# Patient Record
Sex: FEMALE | Race: WHITE | NOT HISPANIC OR LATINO | Employment: FULL TIME | URBAN - METROPOLITAN AREA
[De-identification: names, ages, dates, MRNs, and addresses within clinical notes are randomized per-mention and may not be internally consistent; named-entity substitution may affect disease eponyms.]

---

## 2022-03-21 ENCOUNTER — TELEPHONE (OUTPATIENT)
Dept: AUDIOLOGY | Facility: CLINIC | Age: 42
End: 2022-03-21
Payer: COMMERCIAL

## 2022-04-03 ENCOUNTER — HEALTH MAINTENANCE LETTER (OUTPATIENT)
Age: 42
End: 2022-04-03

## 2022-07-21 PROCEDURE — 36415 COLL VENOUS BLD VENIPUNCTURE: CPT | Performed by: EMERGENCY MEDICINE

## 2022-07-21 PROCEDURE — 81001 URINALYSIS AUTO W/SCOPE: CPT | Performed by: EMERGENCY MEDICINE

## 2022-07-21 PROCEDURE — 99285 EMERGENCY DEPT VISIT HI MDM: CPT | Mod: 25

## 2022-07-21 PROCEDURE — 96375 TX/PRO/DX INJ NEW DRUG ADDON: CPT

## 2022-07-21 PROCEDURE — 96376 TX/PRO/DX INJ SAME DRUG ADON: CPT

## 2022-07-21 PROCEDURE — 85027 COMPLETE CBC AUTOMATED: CPT | Performed by: EMERGENCY MEDICINE

## 2022-07-21 PROCEDURE — 80053 COMPREHEN METABOLIC PANEL: CPT | Performed by: EMERGENCY MEDICINE

## 2022-07-21 RX ORDER — KETOROLAC TROMETHAMINE 15 MG/ML
15 INJECTION, SOLUTION INTRAMUSCULAR; INTRAVENOUS ONCE
Status: COMPLETED | OUTPATIENT
Start: 2022-07-22 | End: 2022-07-22

## 2022-07-21 RX ORDER — ONDANSETRON 2 MG/ML
4 INJECTION INTRAMUSCULAR; INTRAVENOUS ONCE
Status: COMPLETED | OUTPATIENT
Start: 2022-07-22 | End: 2022-07-22

## 2022-07-21 RX ORDER — MORPHINE SULFATE 4 MG/ML
4 INJECTION, SOLUTION INTRAMUSCULAR; INTRAVENOUS ONCE
Status: COMPLETED | OUTPATIENT
Start: 2022-07-22 | End: 2022-07-22

## 2022-07-22 ENCOUNTER — HOSPITAL ENCOUNTER (EMERGENCY)
Facility: CLINIC | Age: 42
Discharge: HOME | End: 2022-07-22
Attending: EMERGENCY MEDICINE | Admitting: EMERGENCY MEDICINE
Payer: COMMERCIAL

## 2022-07-22 ENCOUNTER — APPOINTMENT (OUTPATIENT)
Dept: CT IMAGING | Facility: CLINIC | Age: 42
End: 2022-07-22
Attending: EMERGENCY MEDICINE
Payer: COMMERCIAL

## 2022-07-22 VITALS
HEART RATE: 81 BPM | OXYGEN SATURATION: 95 % | WEIGHT: 154 LBS | DIASTOLIC BLOOD PRESSURE: 78 MMHG | TEMPERATURE: 98.9 F | SYSTOLIC BLOOD PRESSURE: 134 MMHG | RESPIRATION RATE: 16 BRPM

## 2022-07-22 DIAGNOSIS — N20.1 URETEROLITHIASIS: ICD-10-CM

## 2022-07-22 DIAGNOSIS — N23 RENAL COLIC ON RIGHT SIDE: ICD-10-CM

## 2022-07-22 LAB
ALBUMIN SERPL-MCNC: 3.6 G/DL (ref 3.4–5)
ALBUMIN UR-MCNC: 30 MG/DL
ALP SERPL-CCNC: 180 U/L (ref 40–150)
ALT SERPL W P-5'-P-CCNC: 74 U/L (ref 0–50)
ANION GAP SERPL CALCULATED.3IONS-SCNC: 6 MMOL/L (ref 3–14)
APPEARANCE UR: CLEAR
AST SERPL W P-5'-P-CCNC: 48 U/L (ref 0–45)
BILIRUB SERPL-MCNC: 0.4 MG/DL (ref 0.2–1.3)
BILIRUB UR QL STRIP: NEGATIVE
BUN SERPL-MCNC: 18 MG/DL (ref 7–30)
CALCIUM SERPL-MCNC: 9.1 MG/DL (ref 8.5–10.1)
CHLORIDE BLD-SCNC: 105 MMOL/L (ref 94–109)
CO2 SERPL-SCNC: 26 MMOL/L (ref 20–32)
COLOR UR AUTO: ABNORMAL
CREAT SERPL-MCNC: 0.69 MG/DL (ref 0.52–1.04)
ERYTHROCYTE [DISTWIDTH] IN BLOOD BY AUTOMATED COUNT: 14 % (ref 10–15)
GFR SERPL CREATININE-BSD FRML MDRD: >90 ML/MIN/1.73M2
GLUCOSE BLD-MCNC: 252 MG/DL (ref 70–99)
GLUCOSE UR STRIP-MCNC: >=1000 MG/DL
HCT VFR BLD AUTO: 37.9 % (ref 35–47)
HGB BLD-MCNC: 11.5 G/DL (ref 11.7–15.7)
HGB UR QL STRIP: ABNORMAL
HOLD SPECIMEN: NORMAL
KETONES UR STRIP-MCNC: NEGATIVE MG/DL
LEUKOCYTE ESTERASE UR QL STRIP: NEGATIVE
MCH RBC QN AUTO: 25.7 PG (ref 26.5–33)
MCHC RBC AUTO-ENTMCNC: 30.3 G/DL (ref 31.5–36.5)
MCV RBC AUTO: 85 FL (ref 78–100)
MUCOUS THREADS #/AREA URNS LPF: PRESENT /LPF
NITRATE UR QL: NEGATIVE
PH UR STRIP: 5 [PH] (ref 5–7)
PLATELET # BLD AUTO: 110 10E3/UL (ref 150–450)
POTASSIUM BLD-SCNC: 3.8 MMOL/L (ref 3.4–5.3)
PROT SERPL-MCNC: 7.9 G/DL (ref 6.8–8.8)
RBC # BLD AUTO: 4.48 10E6/UL (ref 3.8–5.2)
RBC URINE: 50 /HPF
SODIUM SERPL-SCNC: 137 MMOL/L (ref 133–144)
SP GR UR STRIP: 1.02 (ref 1–1.03)
SQUAMOUS EPITHELIAL: 3 /HPF
UROBILINOGEN UR STRIP-MCNC: NORMAL MG/DL
WBC # BLD AUTO: 5.3 10E3/UL (ref 4–11)
WBC URINE: <1 /HPF

## 2022-07-22 PROCEDURE — 74176 CT ABD & PELVIS W/O CONTRAST: CPT

## 2022-07-22 PROCEDURE — 96374 THER/PROPH/DIAG INJ IV PUSH: CPT

## 2022-07-22 PROCEDURE — 250N000011 HC RX IP 250 OP 636: Performed by: EMERGENCY MEDICINE

## 2022-07-22 RX ORDER — OXYCODONE HYDROCHLORIDE 5 MG/1
5 TABLET ORAL EVERY 6 HOURS PRN
Qty: 12 TABLET | Refills: 0 | Status: SHIPPED | OUTPATIENT
Start: 2022-07-22

## 2022-07-22 RX ORDER — HYDROMORPHONE HYDROCHLORIDE 1 MG/ML
0.5 INJECTION, SOLUTION INTRAMUSCULAR; INTRAVENOUS; SUBCUTANEOUS ONCE
Status: COMPLETED | OUTPATIENT
Start: 2022-07-22 | End: 2022-07-22

## 2022-07-22 RX ORDER — VENLAFAXINE 37.5 MG/1
37.5 TABLET ORAL 2 TIMES DAILY
COMMUNITY

## 2022-07-22 RX ORDER — HYDROMORPHONE HYDROCHLORIDE 1 MG/ML
0.5 INJECTION, SOLUTION INTRAMUSCULAR; INTRAVENOUS; SUBCUTANEOUS
Status: DISCONTINUED | OUTPATIENT
Start: 2022-07-22 | End: 2022-07-22 | Stop reason: HOSPADM

## 2022-07-22 RX ORDER — LISINOPRIL 5 MG/1
5 TABLET ORAL DAILY
COMMUNITY

## 2022-07-22 RX ORDER — ONDANSETRON 4 MG/1
4 TABLET, ORALLY DISINTEGRATING ORAL EVERY 6 HOURS PRN
Qty: 10 TABLET | Refills: 0 | Status: SHIPPED | OUTPATIENT
Start: 2022-07-22 | End: 2022-07-25

## 2022-07-22 RX ORDER — ROSUVASTATIN CALCIUM 10 MG/1
10 TABLET, COATED ORAL DAILY
COMMUNITY

## 2022-07-22 RX ORDER — TAMSULOSIN HYDROCHLORIDE 0.4 MG/1
0.4 CAPSULE ORAL DAILY
Qty: 10 CAPSULE | Refills: 0 | Status: SHIPPED | OUTPATIENT
Start: 2022-07-22 | End: 2022-08-01

## 2022-07-22 RX ADMIN — KETOROLAC TROMETHAMINE 15 MG: 15 INJECTION, SOLUTION INTRAMUSCULAR; INTRAVENOUS at 00:03

## 2022-07-22 RX ADMIN — HYDROMORPHONE HYDROCHLORIDE 0.5 MG: 1 INJECTION, SOLUTION INTRAMUSCULAR; INTRAVENOUS; SUBCUTANEOUS at 02:06

## 2022-07-22 RX ADMIN — MORPHINE SULFATE 4 MG: 4 INJECTION INTRAVENOUS at 00:04

## 2022-07-22 RX ADMIN — HYDROMORPHONE HYDROCHLORIDE 0.5 MG: 1 INJECTION, SOLUTION INTRAMUSCULAR; INTRAVENOUS; SUBCUTANEOUS at 00:33

## 2022-07-22 RX ADMIN — ONDANSETRON 4 MG: 2 INJECTION INTRAMUSCULAR; INTRAVENOUS at 00:03

## 2022-07-22 ASSESSMENT — ENCOUNTER SYMPTOMS
NAUSEA: 1
VOMITING: 1
FLANK PAIN: 1

## 2022-07-22 NOTE — ED TRIAGE NOTES
Pt complaining of right flank pain that started today after flight. History of kidney stones and this feels like previous.      Triage Assessment     Row Name 07/21/22 2835       Triage Assessment (Adult)    Airway WDL WDL       Respiratory WDL    Respiratory WDL WDL       Skin Circulation/Temperature WDL    Skin Circulation/Temperature WDL WDL       Cardiac WDL    Cardiac WDL WDL       Peripheral/Neurovascular WDL    Peripheral Neurovascular WDL WDL       Cognitive/Neuro/Behavioral WDL    Cognitive/Neuro/Behavioral WDL WDL

## 2022-07-22 NOTE — ED PROVIDER NOTES
History     Chief Complaint:  Flank Pain     HPI   Monse Benavidez is a 42 year old female with history of kidney stones and type I diabetes who presents for evaluation of flank pain. Tonight around 2030 the patient started to develop right flank pain with associated nausea and vomiting. This pain has been progressively worsening since onset, prompting her to come into the ED for evaluation. She reports a history of kidney stones and notes that her pain tonight feels identical to what she has experienced with these. She did take some Ibuprofen prior to arrival without significant improvement of her pain. She has never required surgery for her kidney stones.     Review of Systems   Gastrointestinal: Positive for nausea and vomiting.   Genitourinary: Positive for flank pain (right).   All other systems reviewed and are negative.    Allergies:  No known drug allergies    Medications:  Insulin regular  Lisinopril  Metformin  Rosuvastatin  Effexor     Past Medical History:     Kidney stone  Endometrial cancer   CASPER   PCOS  Hypertriglyceridemia  Diabetes mellitus, type I   Myhre syndrome     Past Surgical History:    Appendectomy  Cataract extraction  cholecystectomy  Colonoscopy  Dilation and curettage   Endoscopic release carpal tunnel bilateral  Ear bone anchored, hearing aid and implantation  Omentectomy  JERRY and BSO   Tonsillectomy  Tympanoplasty       Social History:  Marital status:   The patient presents to the ED accompanied by her .     Physical Exam     Patient Vitals for the past 24 hrs:   BP Temp Temp src Pulse Resp SpO2 Weight   07/22/22 0211 134/78 -- -- 81 16 95 % --   07/22/22 0057 -- -- -- -- -- 93 % --   07/22/22 0034 -- -- -- -- -- 96 % --   07/21/22 2332 147/76 98.9  F (37.2  C) Oral 85 20 96 % 69.9 kg (154 lb)     Physical Exam  Nursing note and vitals reviewed.  Constitutional: Cooperative.   HENT:   Mouth/Throat: Mucous membranes are normal.   Cardiovascular: Normal rate,  regular rhythm and normal heart sounds.  No murmur.  Pulmonary/Chest: Effort normal and breath sounds normal. No respiratory distress. No wheezes. No rales.   Abdominal: Soft. Normal appearance and bowel sounds are normal. No distension. There is no tenderness. There is no rigidity and no guarding.   Neurological: Alert. Oriented x4  Skin: Skin is warm and dry.   Psychiatric: Normal mood and affect.      Emergency Department Course     Imaging:  Abd/pelvis CT no contrast - Stone Protocol   Final Result   IMPRESSION:    1.  Obstructing right ureteropelvic junction 0.4 cm calculus, resulting in mild right hydronephrosis. Additional nonobstructing nephrolithiasis bilaterally.       2.  Question mildly nodular hepatic surface contour. Correlate for chronic hepatocellular disease versus cirrhosis.      3.  Mild splenomegaly.   Report per radiology    Laboratory:  Labs Ordered and Resulted from Time of ED Arrival to Time of ED Departure   ROUTINE UA WITH MICROSCOPIC REFLEX TO CULTURE - Abnormal       Result Value    Color Urine Light Yellow      Appearance Urine Clear      Glucose Urine >=1000 (*)     Bilirubin Urine Negative      Ketones Urine Negative      Specific Gravity Urine 1.023      Blood Urine Moderate (*)     pH Urine 5.0      Protein Albumin Urine 30  (*)     Urobilinogen Urine Normal      Nitrite Urine Negative      Leukocyte Esterase Urine Negative      Mucus Urine Present (*)     RBC Urine 50 (*)     WBC Urine <1      Squamous Epithelials Urine 3 (*)    CBC WITH PLATELETS - Abnormal    WBC Count 5.3      RBC Count 4.48      Hemoglobin 11.5 (*)     Hematocrit 37.9      MCV 85      MCH 25.7 (*)     MCHC 30.3 (*)     RDW 14.0      Platelet Count 110 (*)    COMPREHENSIVE METABOLIC PANEL - Abnormal    Sodium 137      Potassium 3.8      Chloride 105      Carbon Dioxide (CO2) 26      Anion Gap 6      Urea Nitrogen 18      Creatinine 0.69      Calcium 9.1      Glucose 252 (*)     Alkaline Phosphatase 180 (*)      AST 48 (*)     ALT 74 (*)     Protein Total 7.9      Albumin 3.6      Bilirubin Total 0.4      GFR Estimate >90         Reviewed:  I reviewed nursing notes, vitals and past medical history    Assessments:  0038:  I obtained history and examined the patient as noted above.   0144: I updated and reassessed the patient. She was resting comfortably.     Interventions:  0003 Toradol 15 mg IV   0003 Zofran 4 mg IV   0004 Morphine 4 mg IV   0033 Dilaudid 0.5 mg IV     Disposition:  The patient was discharged to home.     Impression & Plan     Medical Decision Making:  Monse Benavidez is a 42 year old female presented to the Emergency Department with a complaint of flank pain. The workup in the ED did demonstrate an 0.4 mm renal stone on the right side. The urine is non-infected. At this time, the patient's pain has been controlled. I believe she can be discharged and can follow up in the outpatient setting. I have encouraged close Primary Care Physician follow up. If symptoms persist, Urology evaluation will be warranted. I have encouraged the patient to return to the ED for symptoms such as intractable pain, fever, and persistent vomiting. Full anticipatory guidance given prior to discharge.      Diagnosis:    ICD-10-CM    1. Renal colic on right side  N23    2. Ureterolithiasis  N20.1        Discharge Medications:  Discharge Medication List as of 7/22/2022  1:56 AM      START taking these medications    Details   ondansetron (ZOFRAN ODT) 4 MG ODT tab Take 1 tablet (4 mg) by mouth every 6 hours as needed for nausea, Disp-10 tablet, R-0, Local Print      oxyCODONE (ROXICODONE) 5 MG tablet Take 1 tablet (5 mg) by mouth every 6 hours as needed for severe pain, Disp-12 tablet, R-0, Local Print      tamsulosin (FLOMAX) 0.4 MG capsule Take 1 capsule (0.4 mg) by mouth daily for 10 doses, Disp-10 capsule, R-0, Local Print             Scribe Disclosure:  Jama NEVAREZ, am serving as a scribe at 12:35 AM on 7/22/2022  to document services personally performed by Jerry Barrera MD based on my observations and the provider's statements to me.           Jerry Barrera MD  07/22/22 5475

## 2022-10-03 ENCOUNTER — HEALTH MAINTENANCE LETTER (OUTPATIENT)
Age: 42
End: 2022-10-03

## 2022-11-06 ENCOUNTER — APPOINTMENT (OUTPATIENT)
Dept: CT IMAGING | Facility: CLINIC | Age: 42
End: 2022-11-06
Attending: EMERGENCY MEDICINE
Payer: COMMERCIAL

## 2022-11-06 ENCOUNTER — HOSPITAL ENCOUNTER (EMERGENCY)
Facility: CLINIC | Age: 42
Discharge: HOME | End: 2022-11-06
Attending: EMERGENCY MEDICINE | Admitting: EMERGENCY MEDICINE
Payer: COMMERCIAL

## 2022-11-06 VITALS
TEMPERATURE: 97.3 F | SYSTOLIC BLOOD PRESSURE: 131 MMHG | OXYGEN SATURATION: 95 % | RESPIRATION RATE: 18 BRPM | DIASTOLIC BLOOD PRESSURE: 75 MMHG | HEART RATE: 75 BPM

## 2022-11-06 DIAGNOSIS — K52.9 ENTEROCOLITIS: ICD-10-CM

## 2022-11-06 DIAGNOSIS — E86.0 DEHYDRATION: ICD-10-CM

## 2022-11-06 DIAGNOSIS — R11.0 NAUSEA: ICD-10-CM

## 2022-11-06 LAB
ALBUMIN SERPL BCG-MCNC: 4.3 G/DL (ref 3.5–5.2)
ALP SERPL-CCNC: 209 U/L (ref 35–104)
ALT SERPL W P-5'-P-CCNC: 64 U/L (ref 10–35)
ANION GAP SERPL CALCULATED.3IONS-SCNC: 16 MMOL/L (ref 7–15)
AST SERPL W P-5'-P-CCNC: 62 U/L (ref 10–35)
BASE EXCESS BLDV CALC-SCNC: -3.2 MMOL/L (ref -7.7–1.9)
BASOPHILS # BLD AUTO: 0 10E3/UL (ref 0–0.2)
BASOPHILS NFR BLD AUTO: 0 %
BILIRUB SERPL-MCNC: 0.7 MG/DL
BUN SERPL-MCNC: 15.2 MG/DL (ref 6–20)
C COLI+JEJUNI+LARI FUSA STL QL NAA+PROBE: NOT DETECTED
C DIFF TOX B STL QL: NEGATIVE
CALCIUM SERPL-MCNC: 9.1 MG/DL (ref 8.6–10)
CHLORIDE SERPL-SCNC: 103 MMOL/L (ref 98–107)
CREAT BLD-MCNC: 0.7 MG/DL (ref 0.5–1)
CREAT SERPL-MCNC: 0.68 MG/DL (ref 0.51–0.95)
DEPRECATED HCO3 PLAS-SCNC: 19 MMOL/L (ref 22–29)
EC STX1 GENE STL QL NAA+PROBE: NOT DETECTED
EC STX2 GENE STL QL NAA+PROBE: NOT DETECTED
EOSINOPHIL # BLD AUTO: 0 10E3/UL (ref 0–0.7)
EOSINOPHIL NFR BLD AUTO: 0 %
ERYTHROCYTE [DISTWIDTH] IN BLOOD BY AUTOMATED COUNT: 15.6 % (ref 10–15)
GFR SERPL CREATININE-BSD FRML MDRD: >60 ML/MIN/1.73M2
GFR SERPL CREATININE-BSD FRML MDRD: >90 ML/MIN/1.73M2
GLUCOSE BLDC GLUCOMTR-MCNC: 127 MG/DL (ref 70–99)
GLUCOSE SERPL-MCNC: 65 MG/DL (ref 70–99)
HCO3 BLDV-SCNC: 23 MMOL/L (ref 21–28)
HCT VFR BLD AUTO: 50.4 % (ref 35–47)
HGB BLD-MCNC: 14.9 G/DL (ref 11.7–15.7)
IMM GRANULOCYTES # BLD: 0 10E3/UL
IMM GRANULOCYTES NFR BLD: 0 %
KETONES BLD-SCNC: 0.2 MMOL/L (ref 0–0.6)
LIPASE SERPL-CCNC: 20 U/L (ref 13–60)
LYMPHOCYTES # BLD AUTO: 2.8 10E3/UL (ref 0.8–5.3)
LYMPHOCYTES NFR BLD AUTO: 27 %
MCH RBC QN AUTO: 25.2 PG (ref 26.5–33)
MCHC RBC AUTO-ENTMCNC: 29.6 G/DL (ref 31.5–36.5)
MCV RBC AUTO: 85 FL (ref 78–100)
MONOCYTES # BLD AUTO: 0.7 10E3/UL (ref 0–1.3)
MONOCYTES NFR BLD AUTO: 6 %
NEUTROPHILS # BLD AUTO: 7.1 10E3/UL (ref 1.6–8.3)
NEUTROPHILS NFR BLD AUTO: 67 %
NOROV GI+II ORF1-ORF2 JNC STL QL NAA+PR: NOT DETECTED
NRBC # BLD AUTO: 0 10E3/UL
NRBC BLD AUTO-RTO: 0 /100
O2/TOTAL GAS SETTING VFR VENT: 0 %
OXYHGB MFR BLDV: 89 % (ref 70–75)
PCO2 BLDV: 45 MM HG (ref 40–50)
PH BLDV: 7.32 [PH] (ref 7.32–7.43)
PLATELET # BLD AUTO: 180 10E3/UL (ref 150–450)
PO2 BLDV: 62 MM HG (ref 25–47)
POTASSIUM SERPL-SCNC: 4 MMOL/L (ref 3.4–5.3)
PROT SERPL-MCNC: 8.9 G/DL (ref 6.4–8.3)
RBC # BLD AUTO: 5.91 10E6/UL (ref 3.8–5.2)
RVA NSP5 STL QL NAA+PROBE: NOT DETECTED
SALMONELLA SP RPOD STL QL NAA+PROBE: NOT DETECTED
SHIGELLA SP+EIEC IPAH STL QL NAA+PROBE: NOT DETECTED
SODIUM SERPL-SCNC: 138 MMOL/L (ref 136–145)
V CHOL+PARA RFBL+TRKH+TNAA STL QL NAA+PR: NOT DETECTED
WBC # BLD AUTO: 10.7 10E3/UL (ref 4–11)
Y ENTERO RECN STL QL NAA+PROBE: NOT DETECTED

## 2022-11-06 PROCEDURE — 96365 THER/PROPH/DIAG IV INF INIT: CPT | Mod: 59

## 2022-11-06 PROCEDURE — 85025 COMPLETE CBC W/AUTO DIFF WBC: CPT | Performed by: EMERGENCY MEDICINE

## 2022-11-06 PROCEDURE — 87506 IADNA-DNA/RNA PROBE TQ 6-11: CPT | Performed by: EMERGENCY MEDICINE

## 2022-11-06 PROCEDURE — 96361 HYDRATE IV INFUSION ADD-ON: CPT

## 2022-11-06 PROCEDURE — 87493 C DIFF AMPLIFIED PROBE: CPT | Performed by: EMERGENCY MEDICINE

## 2022-11-06 PROCEDURE — 250N000011 HC RX IP 250 OP 636: Performed by: EMERGENCY MEDICINE

## 2022-11-06 PROCEDURE — 74177 CT ABD & PELVIS W/CONTRAST: CPT

## 2022-11-06 PROCEDURE — 82565 ASSAY OF CREATININE: CPT

## 2022-11-06 PROCEDURE — 82805 BLOOD GASES W/O2 SATURATION: CPT | Performed by: EMERGENCY MEDICINE

## 2022-11-06 PROCEDURE — 96375 TX/PRO/DX INJ NEW DRUG ADDON: CPT

## 2022-11-06 PROCEDURE — 80053 COMPREHEN METABOLIC PANEL: CPT | Performed by: EMERGENCY MEDICINE

## 2022-11-06 PROCEDURE — 82010 KETONE BODYS QUAN: CPT | Performed by: EMERGENCY MEDICINE

## 2022-11-06 PROCEDURE — 99285 EMERGENCY DEPT VISIT HI MDM: CPT | Mod: 25

## 2022-11-06 PROCEDURE — 258N000003 HC RX IP 258 OP 636: Performed by: EMERGENCY MEDICINE

## 2022-11-06 PROCEDURE — 36415 COLL VENOUS BLD VENIPUNCTURE: CPT | Performed by: EMERGENCY MEDICINE

## 2022-11-06 PROCEDURE — 250N000013 HC RX MED GY IP 250 OP 250 PS 637: Performed by: EMERGENCY MEDICINE

## 2022-11-06 PROCEDURE — 83690 ASSAY OF LIPASE: CPT | Performed by: EMERGENCY MEDICINE

## 2022-11-06 PROCEDURE — 82040 ASSAY OF SERUM ALBUMIN: CPT | Performed by: EMERGENCY MEDICINE

## 2022-11-06 PROCEDURE — 258N000001 HC RX 258: Performed by: EMERGENCY MEDICINE

## 2022-11-06 RX ORDER — ONDANSETRON 4 MG/1
4 TABLET, ORALLY DISINTEGRATING ORAL EVERY 8 HOURS PRN
Qty: 10 TABLET | Refills: 0 | Status: SHIPPED | OUTPATIENT
Start: 2022-11-06

## 2022-11-06 RX ORDER — KETOROLAC TROMETHAMINE 15 MG/ML
10 INJECTION, SOLUTION INTRAMUSCULAR; INTRAVENOUS ONCE
Status: COMPLETED | OUTPATIENT
Start: 2022-11-06 | End: 2022-11-06

## 2022-11-06 RX ORDER — IOPAMIDOL 755 MG/ML
500 INJECTION, SOLUTION INTRAVASCULAR ONCE
Status: COMPLETED | OUTPATIENT
Start: 2022-11-06 | End: 2022-11-06

## 2022-11-06 RX ORDER — ACETAMINOPHEN 325 MG/1
650 TABLET ORAL ONCE
Status: COMPLETED | OUTPATIENT
Start: 2022-11-06 | End: 2022-11-06

## 2022-11-06 RX ORDER — DEXTROSE MONOHYDRATE, SODIUM CHLORIDE, SODIUM LACTATE, POTASSIUM CHLORIDE, CALCIUM CHLORIDE 5; 600; 310; 179; 20 G/100ML; MG/100ML; MG/100ML; MG/100ML; MG/100ML
INJECTION, SOLUTION INTRAVENOUS CONTINUOUS
Status: DISCONTINUED | OUTPATIENT
Start: 2022-11-06 | End: 2022-11-06 | Stop reason: HOSPADM

## 2022-11-06 RX ADMIN — SODIUM CHLORIDE, POTASSIUM CHLORIDE, SODIUM LACTATE AND CALCIUM CHLORIDE 1000 ML: 600; 310; 30; 20 INJECTION, SOLUTION INTRAVENOUS at 13:44

## 2022-11-06 RX ADMIN — DEXTROSE MONOHYDRATE, SODIUM CHLORIDE, SODIUM LACTATE, POTASSIUM CHLORIDE, CALCIUM CHLORIDE: 5; 600; 310; 179; 20 INJECTION, SOLUTION INTRAVENOUS at 14:42

## 2022-11-06 RX ADMIN — IOPAMIDOL 75 ML: 755 INJECTION, SOLUTION INTRAVENOUS at 14:09

## 2022-11-06 RX ADMIN — KETOROLAC TROMETHAMINE 10 MG: 15 INJECTION, SOLUTION INTRAMUSCULAR; INTRAVENOUS at 13:44

## 2022-11-06 RX ADMIN — ACETAMINOPHEN 650 MG: 325 TABLET, FILM COATED ORAL at 13:44

## 2022-11-06 ASSESSMENT — ACTIVITIES OF DAILY LIVING (ADL): ADLS_ACUITY_SCORE: 35

## 2022-11-07 NOTE — ED PROVIDER NOTES
[facility] Provider Note  [facility] Emergency Department  8:11 PM  11/6/2022    Monse Hutchins [place]  42 year oldfemale    Chief Complaint   Patient presents with     Diarrhea     Abdominal Pain       HPI:    42-year-old female presents with history of acute onset of diarrhea described as not black or bloody.  Some mild abdominal cramping.  No fever vomiting chest pain shortness of breath known sick contacts or recent travel etc.  She is an insulin-dependent diabetic with her continuous glucose monitor and insulin pump    ROS: 10 point ROS completed and negative other than mentioned above    Past Medical History:   Diagnosis Date     Endometrial cancer      Hypertriglyceridemia      Kidney stone      Myhre syndrome      CASPER (nonalcoholic steatohepatitis)      PCOS (polycystic ovarian syndrome)      Type 1 diabetes mellitus      Past Surgical History:   Procedure Laterality Date     APPENDECTOMY       Cataract extraction NOS       CHOLECYSTECTOMY       COLONOSCOPY       DILATION AND CURETTAGE       ear bone anchored hearing aid and implantation       ENDOSCOPIC RELEASE CARPAL TUNNEL Bilateral      OMENTECTOMY       JERRY AND BSO       TONSILLECTOMY       TYMPANOPLASTY                  No current facility-administered medications on file prior to encounter.  insulin regular 100 UNIT/ML vial,   lisinopril (ZESTRIL) 5 MG tablet, Take 5 mg by mouth daily  METFORMIN HCL PO,   oxyCODONE (ROXICODONE) 5 MG tablet, Take 1 tablet (5 mg) by mouth every 6 hours as needed for severe pain  rosuvastatin (CRESTOR) 10 MG tablet, Take 10 mg by mouth daily  venlafaxine (EFFEXOR) 37.5 MG tablet, Take 37.5 mg by mouth 2 times daily             Allergies   Allergen Reactions     Vancomycin Hives     Hives on face, rash on back         Physical Exam  Vitals: /75   Pulse 75   Temp 97.3  F (36.3  C) (Temporal)   Resp 18   SpO2 95%   Gen: well appearing, in no acute distress  HEENT:  mmm, no rhinorrhea  Neck: supple, no abnormal  swelling  Lungs:  CTAB,  no resp distress  CV: rrr, no m/r/g, ppi  Abd: soft, no significant localizing tenderness palpation, nondistended, no rebound/masses/guarding/hsm  Ext: no peripheral edema  Skin: warm, dry, well perfused, no rashes/bruising/lesions on exposed skin  Neuro: alert, MAEE, no gross motor or sensory deficits,  Psych: Normal mood, normal affect      Labs and Imaging:    Labs Ordered and Resulted from Time of ED Arrival to Time of ED Departure   COMPREHENSIVE METABOLIC PANEL - Abnormal       Result Value    Sodium 138      Potassium 4.0      Chloride 103      Carbon Dioxide (CO2) 19 (*)     Anion Gap 16 (*)     Urea Nitrogen 15.2      Creatinine 0.68      Calcium 9.1      Glucose 65 (*)     Alkaline Phosphatase 209 (*)     AST 62 (*)     ALT 64 (*)     Protein Total 8.9 (*)     Albumin 4.3      Bilirubin Total 0.7      GFR Estimate >90     CBC WITH PLATELETS AND DIFFERENTIAL - Abnormal    WBC Count 10.7      RBC Count 5.91 (*)     Hemoglobin 14.9      Hematocrit 50.4 (*)     MCV 85      MCH 25.2 (*)     MCHC 29.6 (*)     RDW 15.6 (*)     Platelet Count 180      % Neutrophils 67      % Lymphocytes 27      % Monocytes 6      % Eosinophils 0      % Basophils 0      % Immature Granulocytes 0      NRBCs per 100 WBC 0      Absolute Neutrophils 7.1      Absolute Lymphocytes 2.8      Absolute Monocytes 0.7      Absolute Eosinophils 0.0      Absolute Basophils 0.0      Absolute Immature Granulocytes 0.0      Absolute NRBCs 0.0     BLOOD GAS VENOUS WITH OXYHEMOGLOBIN - Abnormal    pH Venous 7.32      pCO2 Venous 45      pO2 Venous 62 (*)     Bicarbonate Venous 23      FIO2 0      Oxyhemoglobin Venous 89 (*)     Base Excess/Deficit (+/-) -3.2     GLUCOSE BY METER - Abnormal    GLUCOSE BY METER POCT 127 (*)    LIPASE - Normal    Lipase 20     ISTAT CREATININE POCT - Normal    Creatinine POCT 0.7      GFR, ESTIMATED POCT >60     KETONE BETA-HYDROXYBUTYRATE QUANTITATIVE, RAPID - Normal    Ketone  (Beta-Hydroxybutyrate) Quantitative 0.2     ENTERIC BACTERIA AND VIRUS PANEL BY VANIA STOOL          Abd/pelvis CT,  IV  contrast only TRAUMA / AAA   Final Result   IMPRESSION:    1.  Enteritis in the right lower quadrant. Left-sided colitis. This process is most likely infectious or inflammatory.   2.  Stable splenomegaly.   3.  Nephrolithiasis.               ED Medications:   Medications   lactated ringers BOLUS 1,000 mL (0 mLs Intravenous Stopped 22 1542)   acetaminophen (TYLENOL) tablet 650 mg (650 mg Oral Given 22 1344)   ketorolac (TORADOL) injection 10 mg (10 mg Intravenous Given 22 1344)   sodium chloride (PF) 0.9% PF flush 100 mL (59 mLs Intravenous Given 22 1409)   iopamidol (ISOVUE-370) solution 500 mL (75 mLs Intravenous Given 22 1409)             Medical Decision Makin-year-old female Past medical history of acute onset of abdominal cramping and nonbloody diarrhea.  CT showing enterocolitis.  Initial blood test showed a blood sugar of 65.  She is feeling well after interventions in ED did do a p.o. challenge with juice with appropriate rise in her sugar.  Given that she passed a p.o. challenge again hydrate herself and manage her sugars at home for Discharge home at this point.  We talked endocrinology pain noninvasive bacterial diarrhea or viral pathogen.  She is comfortable with the plan for return with new or worsening symptoms.      Diagnosis:    ICD-10-CM    1. Enterocolitis  K52.9       2. Dehydration  E86.0       3. Nausea  R11.0             Disposition:  Home      Estiven To MD  [facility]  [facility]       Estiven To MD  22

## 2022-11-07 NOTE — RESULT ENCOUNTER NOTE
Final Enteric Bacteria and Virus Panel by VANIA Stool is NEGATIVE for all tested organisms (bacteria/virus).  No treatment or change in treatment per [facility] Lab Result Enteric Bacteria and Virus Panel protocol.

## 2022-12-10 ENCOUNTER — TRANSFERRED RECORDS (OUTPATIENT)
Dept: HEALTH INFORMATION MANAGEMENT | Facility: CLINIC | Age: 42
End: 2022-12-10

## 2023-02-12 ENCOUNTER — HEALTH MAINTENANCE LETTER (OUTPATIENT)
Age: 43
End: 2023-02-12

## 2023-08-03 ENCOUNTER — APPOINTMENT (OUTPATIENT)
Dept: CT IMAGING | Facility: CLINIC | Age: 43
End: 2023-08-03
Attending: EMERGENCY MEDICINE
Payer: COMMERCIAL

## 2023-08-03 ENCOUNTER — HOSPITAL ENCOUNTER (EMERGENCY)
Facility: CLINIC | Age: 43
Discharge: HOME | End: 2023-08-03
Attending: EMERGENCY MEDICINE | Admitting: EMERGENCY MEDICINE
Payer: COMMERCIAL

## 2023-08-03 VITALS
RESPIRATION RATE: 18 BRPM | SYSTOLIC BLOOD PRESSURE: 138 MMHG | DIASTOLIC BLOOD PRESSURE: 81 MMHG | OXYGEN SATURATION: 99 % | TEMPERATURE: 98.1 F | HEART RATE: 90 BPM

## 2023-08-03 DIAGNOSIS — B34.9 ACUTE VIRAL SYNDROME: ICD-10-CM

## 2023-08-03 DIAGNOSIS — K92.0 HEMATEMESIS WITH NAUSEA: ICD-10-CM

## 2023-08-03 DIAGNOSIS — K62.5 RECTAL BLEEDING: ICD-10-CM

## 2023-08-03 LAB
ABO/RH(D): NORMAL
ALBUMIN SERPL BCG-MCNC: 4 G/DL (ref 3.5–5.2)
ALP SERPL-CCNC: 198 U/L (ref 35–104)
ALT SERPL W P-5'-P-CCNC: 55 U/L (ref 0–50)
ANION GAP SERPL CALCULATED.3IONS-SCNC: 11 MMOL/L (ref 7–15)
ANTIBODY SCREEN: NEGATIVE
APTT PPP: 33 SECONDS (ref 22–38)
AST SERPL W P-5'-P-CCNC: 46 U/L (ref 0–45)
BASOPHILS # BLD AUTO: 0 10E3/UL (ref 0–0.2)
BASOPHILS NFR BLD AUTO: 0 %
BILIRUB DIRECT SERPL-MCNC: <0.2 MG/DL (ref 0–0.3)
BILIRUB SERPL-MCNC: 0.5 MG/DL
BUN SERPL-MCNC: 12.2 MG/DL (ref 6–20)
CALCIUM SERPL-MCNC: 9.1 MG/DL (ref 8.6–10)
CHLORIDE SERPL-SCNC: 101 MMOL/L (ref 98–107)
CREAT SERPL-MCNC: 0.6 MG/DL (ref 0.51–0.95)
DEPRECATED HCO3 PLAS-SCNC: 25 MMOL/L (ref 22–29)
EOSINOPHIL # BLD AUTO: 0.1 10E3/UL (ref 0–0.7)
EOSINOPHIL NFR BLD AUTO: 1 %
ERYTHROCYTE [DISTWIDTH] IN BLOOD BY AUTOMATED COUNT: 14.4 % (ref 10–15)
FLUAV RNA SPEC QL NAA+PROBE: NEGATIVE
FLUBV RNA RESP QL NAA+PROBE: NEGATIVE
GFR SERPL CREATININE-BSD FRML MDRD: >90 ML/MIN/1.73M2
GLUCOSE SERPL-MCNC: 170 MG/DL (ref 70–99)
HCT VFR BLD AUTO: 41.2 % (ref 35–47)
HGB BLD-MCNC: 13.4 G/DL (ref 11.7–15.7)
HOLD SPECIMEN: NORMAL
IMM GRANULOCYTES # BLD: 0 10E3/UL
IMM GRANULOCYTES NFR BLD: 0 %
INR PPP: 1.06 (ref 0.85–1.15)
LYMPHOCYTES # BLD AUTO: 2.5 10E3/UL (ref 0.8–5.3)
LYMPHOCYTES NFR BLD AUTO: 36 %
MCH RBC QN AUTO: 27.2 PG (ref 26.5–33)
MCHC RBC AUTO-ENTMCNC: 32.5 G/DL (ref 31.5–36.5)
MCV RBC AUTO: 84 FL (ref 78–100)
MONOCYTES # BLD AUTO: 0.5 10E3/UL (ref 0–1.3)
MONOCYTES NFR BLD AUTO: 7 %
NEUTROPHILS # BLD AUTO: 3.9 10E3/UL (ref 1.6–8.3)
NEUTROPHILS NFR BLD AUTO: 56 %
NRBC # BLD AUTO: 0 10E3/UL
NRBC BLD AUTO-RTO: 0 /100
PLATELET # BLD AUTO: 110 10E3/UL (ref 150–450)
POTASSIUM SERPL-SCNC: 3.9 MMOL/L (ref 3.4–5.3)
PROT SERPL-MCNC: 7.5 G/DL (ref 6.4–8.3)
RBC # BLD AUTO: 4.93 10E6/UL (ref 3.8–5.2)
RSV RNA SPEC NAA+PROBE: NEGATIVE
SARS-COV-2 RNA RESP QL NAA+PROBE: NEGATIVE
SODIUM SERPL-SCNC: 137 MMOL/L (ref 136–145)
SPECIMEN EXPIRATION DATE: NORMAL
WBC # BLD AUTO: 7 10E3/UL (ref 4–11)

## 2023-08-03 PROCEDURE — 85730 THROMBOPLASTIN TIME PARTIAL: CPT | Performed by: EMERGENCY MEDICINE

## 2023-08-03 PROCEDURE — 99285 EMERGENCY DEPT VISIT HI MDM: CPT | Mod: 25

## 2023-08-03 PROCEDURE — 86850 RBC ANTIBODY SCREEN: CPT | Performed by: EMERGENCY MEDICINE

## 2023-08-03 PROCEDURE — 86901 BLOOD TYPING SEROLOGIC RH(D): CPT | Performed by: EMERGENCY MEDICINE

## 2023-08-03 PROCEDURE — 87637 SARSCOV2&INF A&B&RSV AMP PRB: CPT | Performed by: EMERGENCY MEDICINE

## 2023-08-03 PROCEDURE — 250N000011 HC RX IP 250 OP 636: Performed by: EMERGENCY MEDICINE

## 2023-08-03 PROCEDURE — 96375 TX/PRO/DX INJ NEW DRUG ADDON: CPT | Mod: 59

## 2023-08-03 PROCEDURE — 74177 CT ABD & PELVIS W/CONTRAST: CPT

## 2023-08-03 PROCEDURE — 36415 COLL VENOUS BLD VENIPUNCTURE: CPT | Performed by: EMERGENCY MEDICINE

## 2023-08-03 PROCEDURE — 96374 THER/PROPH/DIAG INJ IV PUSH: CPT | Mod: 59

## 2023-08-03 PROCEDURE — 85610 PROTHROMBIN TIME: CPT | Performed by: EMERGENCY MEDICINE

## 2023-08-03 PROCEDURE — 80053 COMPREHEN METABOLIC PANEL: CPT | Performed by: EMERGENCY MEDICINE

## 2023-08-03 PROCEDURE — 85025 COMPLETE CBC W/AUTO DIFF WBC: CPT | Performed by: EMERGENCY MEDICINE

## 2023-08-03 PROCEDURE — 250N000009 HC RX 250: Performed by: EMERGENCY MEDICINE

## 2023-08-03 PROCEDURE — 93005 ELECTROCARDIOGRAM TRACING: CPT

## 2023-08-03 PROCEDURE — C9113 INJ PANTOPRAZOLE SODIUM, VIA: HCPCS | Mod: JZ | Performed by: EMERGENCY MEDICINE

## 2023-08-03 PROCEDURE — 82248 BILIRUBIN DIRECT: CPT | Performed by: EMERGENCY MEDICINE

## 2023-08-03 RX ORDER — MORPHINE SULFATE 4 MG/ML
4 INJECTION, SOLUTION INTRAMUSCULAR; INTRAVENOUS
Status: DISCONTINUED | OUTPATIENT
Start: 2023-08-03 | End: 2023-08-03 | Stop reason: HOSPADM

## 2023-08-03 RX ORDER — ONDANSETRON 2 MG/ML
4 INJECTION INTRAMUSCULAR; INTRAVENOUS ONCE
Status: COMPLETED | OUTPATIENT
Start: 2023-08-03 | End: 2023-08-03

## 2023-08-03 RX ORDER — HEPARIN SODIUM (PORCINE) LOCK FLUSH IV SOLN 100 UNIT/ML 100 UNIT/ML
5-10 SOLUTION INTRAVENOUS
Status: DISCONTINUED | OUTPATIENT
Start: 2023-08-03 | End: 2023-08-03 | Stop reason: HOSPADM

## 2023-08-03 RX ORDER — IOPAMIDOL 755 MG/ML
120 INJECTION, SOLUTION INTRAVASCULAR ONCE
Status: COMPLETED | OUTPATIENT
Start: 2023-08-03 | End: 2023-08-03

## 2023-08-03 RX ADMIN — IOPAMIDOL 65 ML: 755 INJECTION, SOLUTION INTRAVENOUS at 18:41

## 2023-08-03 RX ADMIN — ONDANSETRON 4 MG: 2 INJECTION INTRAMUSCULAR; INTRAVENOUS at 17:56

## 2023-08-03 RX ADMIN — MORPHINE SULFATE 4 MG: 4 INJECTION, SOLUTION INTRAMUSCULAR; INTRAVENOUS at 17:56

## 2023-08-03 RX ADMIN — HEPARIN SODIUM (PORCINE) LOCK FLUSH IV SOLN 100 UNIT/ML 5 ML: 100 SOLUTION at 19:48

## 2023-08-03 RX ADMIN — PANTOPRAZOLE SODIUM 40 MG: 40 INJECTION, POWDER, FOR SOLUTION INTRAVENOUS at 17:56

## 2023-08-03 RX ADMIN — SODIUM CHLORIDE 87 ML: 9 INJECTION, SOLUTION INTRAVENOUS at 18:43

## 2023-08-03 ASSESSMENT — ACTIVITIES OF DAILY LIVING (ADL)
ADLS_ACUITY_SCORE: 35
ADLS_ACUITY_SCORE: 35

## 2023-08-03 NOTE — ED TRIAGE NOTES
Patient reports she has been ill since Saturday with fever, cough, vomiting. Patient states the has vomited blood at home and had one bloody stool.       Triage Assessment       Row Name 08/03/23 8418       Triage Assessment (Adult)    Airway WDL WDL       Respiratory WDL    Respiratory WDL WDL       Skin Circulation/Temperature WDL    Skin Circulation/Temperature WDL WDL       Cardiac WDL    Cardiac WDL WDL       Peripheral/Neurovascular WDL    Peripheral Neurovascular WDL WDL       Cognitive/Neuro/Behavioral WDL    Cognitive/Neuro/Behavioral WDL WDL

## 2023-08-04 LAB
ATRIAL RATE - MUSE: 82 BPM
DIASTOLIC BLOOD PRESSURE - MUSE: NORMAL MMHG
INTERPRETATION ECG - MUSE: NORMAL
P AXIS - MUSE: 46 DEGREES
PR INTERVAL - MUSE: 140 MS
QRS DURATION - MUSE: 88 MS
QT - MUSE: 358 MS
QTC - MUSE: 418 MS
R AXIS - MUSE: -10 DEGREES
SYSTOLIC BLOOD PRESSURE - MUSE: NORMAL MMHG
T AXIS - MUSE: 44 DEGREES
VENTRICULAR RATE- MUSE: 82 BPM

## 2024-05-19 ENCOUNTER — HEALTH MAINTENANCE LETTER (OUTPATIENT)
Age: 44
End: 2024-05-19

## 2025-03-06 ENCOUNTER — ANESTHESIA EVENT (OUTPATIENT)
Dept: SURGERY | Facility: CLINIC | Age: 45
End: 2025-03-06

## 2025-03-07 ENCOUNTER — ANESTHESIA (OUTPATIENT)
Dept: SURGERY | Facility: CLINIC | Age: 45
End: 2025-03-07

## 2025-03-07 NOTE — ANESTHESIA PREPROCEDURE EVALUATION
Anesthesia Pre-Procedure Evaluation    Patient: Monse Benavidez   MRN: [id number] : 1980        Procedure : Procedure(s):  LEFT SHOULDER MANIPULATION UNDER ANESTHESIA          Past Medical History:   Diagnosis Date    Acanthosis nigricans     Bilateral sensorineural hearing loss     Calculus of kidney     Cervical myelopathy (H)     Cirrhosis of liver (H)     Combined immunodeficiency disorder (H)     Difficult airway for intubation     Dysfunction of both eustachian tubes     Dyspareunia, female     Elevated coronary artery calcium score     Family hx of premature CAD     HTN (hypertension)     Hx of endometrial cancer     Hypertriglyceridemia     Hypertriglyceridemia     ILD (interstitial lung disease) (H)     Immature cataract     Kidney stone     Lichen sclerosus     Lipodystrophy     Multiple congenital abnormalities     Myhre syndrome     CASPER (nonalcoholic steatohepatitis)     Nonproliferative diabetic retinopathy (H)     PCOS (polycystic ovarian syndrome)     Postablative ovarian failure     Predominantly B-cell defect (H)     Presence of intraocular lens     Restriction of joint motion     Restrictive lung disease     Seborrhea sicca in adult     Seronegative rheumatoid arthritis (H)     Tear film insufficiency     Type 1 diabetes mellitus       Past Surgical History:   Procedure Laterality Date    APPENDECTOMY      CARPAL TUNNEL RELEASE  2016    CARPAL TUNNEL RELEASE  2016    CARPAL TUNNEL RELEASE REVISION Left 2023    CATARACT EXTRACTION, BILATERAL Bilateral 10/2012    CERVICAL LAMINECTOMY C1-C6  2022    CHOLECYSTECTOMY      COLONOSCOPY      CYSTOSCOPY W/ URETERAL STENT PLACEMENT  Bilateral 2022    CYSTOSCOPY W/ URETEROSCOPY W/ LITHOTRIPSY Bilateral 2022    DILATION AND CURETTAGE      EAR BONE ANCHORED HEARING AID AND IMPLANTATION   2021    ENDOSCOPIC RELEASE CARPAL TUNNEL Bilateral     OMENTECTOMY      PORTACATH PLACEMENT      SMALL INTESTINE  "SURGERY  2012    JERRY AND BSO      TONSILLECTOMY      TYMPANOPLASTY        Allergies   Allergen Reactions    Erythromycin Hives    Vancomycin Hives and Rash     Hives on face  Rash on back      Social History     Tobacco Use    Smoking status: Never    Smokeless tobacco: Never   Substance Use Topics    Alcohol use: Yes     Comment: occasionally      Wt Readings from Last 1 Encounters:   07/21/22 69.9 kg (154 lb)        Anesthesia Evaluation   Pt has had prior anesthetic.     History of anesthetic complications  - difficult airway.  awake FOB in past. Also tolerated MAC and GA with LMAs.    ROS/MED HX  ENT/Pulmonary: Comment: From May 5/2024:  \"Pt on GLP-1 inhibitor. Felt to be higher risk for full stomach and aspiration. C-spine fused; small mouth opening. Difficult airway. Induced anesthesia with Propofol/succinylcholine. Poor view with 3-0 size glidescope:Very anterior cords. Large tongue. No neck extension. Attempted view with 2.5 glidescope with similarly poor view. Decided to abandon intubation and go ahead with EGD under MAC. Pt tolerated the MAC well.\"      Neurologic:       Cardiovascular:     (+)  hypertension- -  CAD (elevated Ca++) -  - -                                      METS/Exercise Tolerance:     Hematologic:       Musculoskeletal:       GI/Hepatic:     (+)           hepatitis (CASPER) type Other,        Renal/Genitourinary:       Endo:     (+) type I DM,    Using insulin,                 Psychiatric/Substance Use:       Infectious Disease:       Malignancy:       Other: Comment: Myhre Syndrome                Echo Transthoracic (TTE)    Anatomical Region Laterality Modality   -- -- Echocardiography     Impression    Intravenous Definity ultrasound enhancement agent(s) administered to enhance endocardial border definition. Last full echocardiogram performed 04/07/2022. The available image quality was limited.  LEFT VENTRICLE:Normal left ventricular chamber size. Normal left ventricular wall thickness. " Calculated 2-D monoplane volumetric left ventricular ejection fraction 65% with the use of ultrasound enhancing agent. No regional wall motion abnormalities.  Normal left ventricular filling pressure at rest.  RIGHT VENTRICLE:Normal right ventricular chamber size. Normal right ventricular systolic function. Unable to estimate right ventricular systolic pressure due to an inadequate Doppler regurgitation signal.  ATRIA:Normal left atrial size by visual estimate. Normal right atrial size by visual estimate.  CARDIAC VALVES:Mildly thickened aortic valve. Trivial aortic valve regurgitation. Mildly thickened mitral valve. Mild mitral valve regurgitation. Normal pulmonary valve. Trivial pulmonary valve regurgitation. Normal tricuspid valve. Trivial tricuspid  valve regurgitation.  OTHER ECHO FINDINGS:Normal inferior vena cava size with normal inspiratory collapse (>50%). Normal sinus of Valsalva diameter of 33 mm. Upper limit of normal of the sinus of Valsalva for age, sex and BSA is 36 mm. Mid ascending aorta not visualized.  Abdominal aorta incompletely visualized. Normal abdominal aorta Doppler flow pattern. No intracardiac mass or thrombus, but the left atrial appendage cannot be visualized adequately with transthoracic echo to exclude thrombus in this location. No    pericardial effusion. Attempts were made to optimize the echocardiographic images and two or more left ventricular segments were not visualized adequately to evaluate cardiac structure. The patient's current allergies and medications have been screened.  Imaging enhancement agent administered per Echocardiography Contrast Administration Protocol Reference Document [id number] Rev 04/14/2022. Patient met an inclusion criterion and did not have contraindications in screening sections.    For the complete report, see the Order-Level Documents.  Narrative    For the complete report, see the Order-Level Documents.    Hemodynamics  Heart Rate: 67 BPM  Blood  "Pressure: 151 / 70 mmHg  ECG: Sinus rhythm    Final Impressions  1. Normal left ventricular chamber size , calculated ejection fraction 65%, no regional wall motion abnormalities.  2. Normal left ventricular filling pressure.  3. Normal right ventricular chamber size, normal systolic function . Unable to estimate right ventricular systolic pressure due to an inadequate Doppler regurgitation signal.  4. Normal sized atria by visual estimate.  5. No hemodynamically significant valvular heart disease.  6. No  pericardial effusion.  7. Normal sinus of Valsalva diameter of 33 mm . Upper limit of normal of the sinus of Valsalva for age, sex and BSA is 36 mm.  Mid ascending aorta not well visualized.  8. Compared to the report of 04/02/2024 no significant change has occurred.            OUTSIDE LABS:  CBC:   Lab Results   Component Value Date    WBC 7.0 08/03/2023    WBC 10.7 11/06/2022    HGB 13.4 08/03/2023    HGB 14.9 11/06/2022    HCT 41.2 08/03/2023    HCT 50.4 (H) 11/06/2022     (L) 08/03/2023     11/06/2022     BMP:   Lab Results   Component Value Date     08/03/2023     11/06/2022    POTASSIUM 3.9 08/03/2023    POTASSIUM 4.0 11/06/2022    CHLORIDE 101 08/03/2023    CHLORIDE 103 11/06/2022    CO2 25 08/03/2023    CO2 19 (L) 11/06/2022    BUN 12.2 08/03/2023    BUN 15.2 11/06/2022    CR 0.60 08/03/2023    CR 0.7 11/06/2022     (H) 08/03/2023     (H) 11/06/2022     COAGS:   Lab Results   Component Value Date    PTT 33 08/03/2023    INR 1.06 08/03/2023     POC: No results found for: \"BGM\", \"HCG\", \"HCGS\"  HEPATIC:   Lab Results   Component Value Date    ALBUMIN 4.0 08/03/2023    PROTTOTAL 7.5 08/03/2023    ALT 55 (H) 08/03/2023    AST 46 (H) 08/03/2023    ALKPHOS 198 (H) 08/03/2023    BILITOTAL 0.5 08/03/2023     OTHER:   Lab Results   Component Value Date    ANN MARIE 9.1 08/03/2023    LIPASE 20 11/06/2022       Anesthesia Plan                   Techniques and Equipment:     - Airway: " Video-Laryngoscope, Fiberoptic Bronchoscope       Consents            Postoperative Care            Comments:    Other Comments: Preop glyco    Difficult airway cart/FOB in room with 6.0 ETT. LMA 3 available.           Vincent Harris MD    I have reviewed the pertinent notes and labs in the chart from the past 30 days and (re)examined the patient.  Any updates or changes from those notes are reflected in this note.    Clinically Significant Risk Factors Present on Admission

## 2025-04-18 RX ORDER — IBUPROFEN 800 MG/1
800 TABLET, FILM COATED ORAL EVERY 8 HOURS PRN
COMMUNITY

## 2025-04-18 RX ORDER — ACETAMINOPHEN 500 MG
500-1000 TABLET ORAL EVERY 6 HOURS PRN
COMMUNITY

## 2025-04-21 ENCOUNTER — ANESTHESIA EVENT (OUTPATIENT)
Dept: SURGERY | Facility: CLINIC | Age: 45
End: 2025-04-21
Payer: COMMERCIAL

## 2025-04-21 RX ORDER — ESZOPICLONE 3 MG/1
3 TABLET, FILM COATED ORAL AT BEDTIME
COMMUNITY

## 2025-04-21 NOTE — ANESTHESIA PREPROCEDURE EVALUATION
Anesthesia Pre-Procedure Evaluation    Patient: Monse Benavidez   MRN: [id number] : 1980        Procedure : Procedure(s):  LEFT SHOULDER MANIPULATION UNDER ANESTHESIA          Past Medical History:   Diagnosis Date    Acanthosis nigricans     Bilateral sensorineural hearing loss     Calculus of kidney     Cervical myelopathy (H)     Cirrhosis of liver (H)     Combined immunodeficiency disorder (H)     Developmental abnormality of tooth size and form     Difficult airway for intubation     Dysfunction of both eustachian tubes     Dyspareunia, female     Elevated coronary artery calcium score     Family hx of premature CAD     HTN (hypertension)     Hx of endometrial cancer     Hypertriglyceridemia     ILD (interstitial lung disease) (H)     Immature cataract     Kidney stone     Lichen sclerosus     Lipodystrophy     Moderate nonproliferative diabetic retinopathy associated with type 1 diabetes mellitus (H)     Multiple congenital abnormalities     Myhre syndrome     CASPER (nonalcoholic steatohepatitis)     Nonproliferative diabetic retinopathy (H)     PCOS (polycystic ovarian syndrome)     Postablative ovarian failure     Predominantly B-cell defect (H)     Presence of intraocular lens     Restriction of joint motion     Restrictive lung disease     Seborrhea sicca in adult     Seronegative rheumatoid arthritis (H)     Tear film insufficiency     Type 1 diabetes mellitus       Past Surgical History:   Procedure Laterality Date    APPENDECTOMY      CARPAL TUNNEL RELEASE  2016    CARPAL TUNNEL RELEASE  2016    CARPAL TUNNEL RELEASE REVISION Left 2023    CATARACT EXTRACTION, BILATERAL Bilateral 10/2012    CERVICAL LAMINECTOMY C1-C6  2022    CHOLECYSTECTOMY      COLONOSCOPY      CYSTOSCOPY W/ URETERAL STENT PLACEMENT  Bilateral 2022    CYSTOSCOPY W/ URETEROSCOPY W/ LITHOTRIPSY Bilateral 2022    DILATION AND CURETTAGE      EAR BONE ANCHORED HEARING AID AND IMPLANTATION    08/04/2021    ENDOSCOPIC RELEASE CARPAL TUNNEL Bilateral     OMENTECTOMY      PORTACATH PLACEMENT  2022    SMALL INTESTINE SURGERY  2012    JERRY AND BSO      TONSILLECTOMY      TYMPANOPLASTY        Allergies   Allergen Reactions    Erythromycin Hives    Vancomycin Hives and Rash     Hives on face  Rash on back      Social History     Tobacco Use    Smoking status: Never    Smokeless tobacco: Never   Substance Use Topics    Alcohol use: Yes     Comment: occasionally      Wt Readings from Last 1 Encounters:   07/21/22 69.9 kg (154 lb)        Prior to Admission medications    Medication Sig Start Date End Date Taking? Authorizing Provider   acetaminophen (TYLENOL) 500 MG tablet Take 500-1,000 mg by mouth every 6 hours as needed for mild pain.   Yes Reported, Patient   Buprenorphine HCl (BELBUCA) 150 MCG FILM buccal film Place 300 mcg inside cheek every 12 hours.   Yes Reported, Patient   diclofenac (VOLTAREN) 1 % topical gel Apply topically 4 times daily.   Yes Reported, Patient   eszopiclone (LUNESTA) 3 MG tablet Take 3 mg by mouth at bedtime.   Yes Reported, Patient   ibuprofen (ADVIL/MOTRIN) 800 MG tablet Take 800 mg by mouth every 8 hours as needed for moderate pain.   Yes Reported, Patient   Naldemedine Tosylate 0.2 MG TABS Take 1 tablet by mouth daily.   Yes Reported, Patient   tirzepatide (MOUNJARO) 10 MG/0.5ML SOAJ auto-injector pen Inject 10 mg subcutaneously once a week.   Yes Reported, Patient   insulin regular 100 UNIT/ML vial     Reported, Patient   lisinopril (ZESTRIL) 5 MG tablet Take 5 mg by mouth daily    Reported, Patient   METFORMIN HCL PO     Reported, Patient   ondansetron (ZOFRAN ODT) 4 MG ODT tab Take 1 tablet (4 mg) by mouth every 8 hours as needed for nausea 11/6/22   Estiven To MD   oxyCODONE (ROXICODONE) 5 MG tablet Take 1 tablet (5 mg) by mouth every 6 hours as needed for severe pain 7/22/22   Jerry Barrera MD   rosuvastatin (CRESTOR) 10 MG tablet Take 10 mg by mouth daily     Reported, Patient   venlafaxine (EFFEXOR) 37.5 MG tablet Take 37.5 mg by mouth 2 times daily    Reported, Patient   lidocaine-prilocaine (EMLA) 2.5-2.5 % external cream Apply topically daily as needed for moderate pain.  3/7/25  Reported, Patient     ECG 8/3/23: Sinus rhythm   Anterior infarct , age undetermined   Abnormal ECG     ECHO 1/20/25: Final Impressions   1. Normal left ventricular chamber size , calculated ejection fraction 65%, no regional wall motion abnormalities.   2. Normal left ventricular filling pressure.   3. Normal right ventricular chamber size, normal systolic function . Unable to estimate right ventricular systolic pressure due to an inadequate Doppler regurgitation signal.   4. Normal sized atria by visual estimate.   5. No hemodynamically significant valvular heart disease.   6. No  pericardial effusion.   7. Normal sinus of Valsalva diameter of 33 mm . Upper limit of normal of the sinus of Valsalva for age, sex and BSA is 36 mm.  Mid ascending aorta not well visualized.   8. Compared to the report of 04/02/2024 no significant change has occurred.   CXR:      Anesthesia Evaluation   Pt has had prior anesthetic. Type: General.    History of anesthetic complications  - difficult airway.  awake FOB in past. Also tolerated MAC and GA with LMAs.    ROS/MED HX  ENT/Pulmonary:    (-) tobacco use, asthma and sleep apnea   Neurologic: Comment: Poor neck extension   (-) no seizures, no CVA and migraines   Cardiovascular:     (+)  hypertension- -  CAD (elevated Ca++) -  - -                                   (-) LE, arrhythmias, valvular problems/murmurs, dyslipidemia and murmur   METS/Exercise Tolerance:     Hematologic:    (-) history of blood clots and anemia   Musculoskeletal: Comment: Rare congential connective tissue disorder  Frozen shoulder   (-) arthritis   GI/Hepatic:     (+)           hepatitis (CASPER) type Other, liver disease (CASPER),    (-) GERD   Renal/Genitourinary:     (+)        "Nephrolithiasis ,    (-) renal disease   Endo: Comment: PCOS  Taking monjaro    (+) type I DM,    Using insulin,              (-) Type II DM, thyroid disease and obesity   Psychiatric/Substance Use:     (+) psychiatric history        Infectious Disease:    (-) Recent Fever   Malignancy:       Other: Comment: Myhre Syndrome     (+)  , H/O Chronic Pain,         Physical Exam    Airway        Mallampati: IV   TM distance: < 3 FB   Neck ROM: limited   Mouth opening: < 3 cm    Respiratory Devices and Support         Dental       (+) Multiple visibly decayed, broken teeth      Cardiovascular   cardiovascular exam normal       Rhythm and rate: regular and normal (-) no murmur    Pulmonary   pulmonary exam normal        breath sounds clear to auscultation           OUTSIDE LABS:  CBC:   Lab Results   Component Value Date    WBC 7.0 08/03/2023    WBC 10.7 11/06/2022    HGB 13.4 08/03/2023    HGB 14.9 11/06/2022    HCT 41.2 08/03/2023    HCT 50.4 (H) 11/06/2022     (L) 08/03/2023     11/06/2022     BMP:   Lab Results   Component Value Date     08/03/2023     11/06/2022    POTASSIUM 3.9 08/03/2023    POTASSIUM 4.0 11/06/2022    CHLORIDE 101 08/03/2023    CHLORIDE 103 11/06/2022    CO2 25 08/03/2023    CO2 19 (L) 11/06/2022    BUN 12.2 08/03/2023    BUN 15.2 11/06/2022    CR 0.60 08/03/2023    CR 0.7 11/06/2022     (H) 08/03/2023     (H) 11/06/2022     COAGS:   Lab Results   Component Value Date    PTT 33 08/03/2023    INR 1.06 08/03/2023     POC: No results found for: \"BGM\", \"HCG\", \"HCGS\"  HEPATIC:   Lab Results   Component Value Date    ALBUMIN 4.0 08/03/2023    PROTTOTAL 7.5 08/03/2023    ALT 55 (H) 08/03/2023    AST 46 (H) 08/03/2023    ALKPHOS 198 (H) 08/03/2023    BILITOTAL 0.5 08/03/2023     OTHER:   Lab Results   Component Value Date    ANN MARIE 9.1 08/03/2023    LIPASE 20 11/06/2022       Anesthesia Plan    ASA Status:  3    NPO Status:  NPO Appropriate    Anesthesia Type: MAC.       "        Consents    Anesthesia Plan(s) and associated risks, benefits, and realistic alternatives discussed. Questions answered and patient/representative(s) expressed understanding.     - Discussed:     - Discussed with:  Patient            Postoperative Care    Pain management: Multi-modal analgesia.   PONV prophylaxis: Ondansetron (or other 5HT-3)     Comments:    Other Comments: Ketamine, dexmedetomidine, minimal propofol  Avoid fentanyl           Celi Almaraz MD    Clinically Significant Risk Factors Present on Admission

## 2025-04-22 ENCOUNTER — ANESTHESIA (OUTPATIENT)
Dept: SURGERY | Facility: CLINIC | Age: 45
End: 2025-04-22
Payer: COMMERCIAL

## 2025-04-22 ENCOUNTER — HOSPITAL ENCOUNTER (OUTPATIENT)
Facility: CLINIC | Age: 45
Discharge: HOME | End: 2025-04-22
Attending: ORTHOPAEDIC SURGERY | Admitting: ORTHOPAEDIC SURGERY
Payer: COMMERCIAL

## 2025-04-22 VITALS
HEIGHT: 61 IN | OXYGEN SATURATION: 94 % | SYSTOLIC BLOOD PRESSURE: 112 MMHG | BODY MASS INDEX: 31.78 KG/M2 | DIASTOLIC BLOOD PRESSURE: 70 MMHG | HEART RATE: 100 BPM | RESPIRATION RATE: 16 BRPM | WEIGHT: 168.3 LBS | TEMPERATURE: 98 F

## 2025-04-22 DIAGNOSIS — M75.00 ADHESIVE CAPSULITIS OF SHOULDER, UNSPECIFIED LATERALITY: Primary | ICD-10-CM

## 2025-04-22 PROCEDURE — 250N000011 HC RX IP 250 OP 636: Performed by: NURSE ANESTHETIST, CERTIFIED REGISTERED

## 2025-04-22 PROCEDURE — 999N000141 HC STATISTIC PRE-PROCEDURE NURSING ASSESSMENT: Performed by: ORTHOPAEDIC SURGERY

## 2025-04-22 PROCEDURE — 250N000013 HC RX MED GY IP 250 OP 250 PS 637: Performed by: ANESTHESIOLOGY

## 2025-04-22 PROCEDURE — 250N000009 HC RX 250: Performed by: NURSE ANESTHETIST, CERTIFIED REGISTERED

## 2025-04-22 PROCEDURE — 710N000009 HC RECOVERY PHASE 1, LEVEL 1, PER MIN: Performed by: ORTHOPAEDIC SURGERY

## 2025-04-22 PROCEDURE — 250N000009 HC RX 250: Performed by: ORTHOPAEDIC SURGERY

## 2025-04-22 PROCEDURE — 258N000003 HC RX IP 258 OP 636: Performed by: NURSE ANESTHETIST, CERTIFIED REGISTERED

## 2025-04-22 PROCEDURE — 710N000012 HC RECOVERY PHASE 2, PER MINUTE: Performed by: ORTHOPAEDIC SURGERY

## 2025-04-22 PROCEDURE — 360N000074 HC SURGERY LEVEL 1, PER MIN: Performed by: ORTHOPAEDIC SURGERY

## 2025-04-22 PROCEDURE — 250N000011 HC RX IP 250 OP 636: Mod: JZ | Performed by: ORTHOPAEDIC SURGERY

## 2025-04-22 PROCEDURE — 370N000017 HC ANESTHESIA TECHNICAL FEE, PER MIN: Performed by: ORTHOPAEDIC SURGERY

## 2025-04-22 PROCEDURE — 250N000011 HC RX IP 250 OP 636: Mod: JZ | Performed by: ANESTHESIOLOGY

## 2025-04-22 RX ORDER — ONDANSETRON 4 MG/1
4 TABLET, ORALLY DISINTEGRATING ORAL EVERY 30 MIN PRN
Status: DISCONTINUED | OUTPATIENT
Start: 2025-04-22 | End: 2025-04-22 | Stop reason: HOSPADM

## 2025-04-22 RX ORDER — DEXAMETHASONE SODIUM PHOSPHATE 4 MG/ML
4 INJECTION, SOLUTION INTRA-ARTICULAR; INTRALESIONAL; INTRAMUSCULAR; INTRAVENOUS; SOFT TISSUE
Status: DISCONTINUED | OUTPATIENT
Start: 2025-04-22 | End: 2025-04-22 | Stop reason: HOSPADM

## 2025-04-22 RX ORDER — HYDROMORPHONE HCL IN WATER/PF 6 MG/30 ML
0.2 PATIENT CONTROLLED ANALGESIA SYRINGE INTRAVENOUS EVERY 5 MIN PRN
Status: DISCONTINUED | OUTPATIENT
Start: 2025-04-22 | End: 2025-04-22 | Stop reason: HOSPADM

## 2025-04-22 RX ORDER — SODIUM CHLORIDE, SODIUM LACTATE, POTASSIUM CHLORIDE, CALCIUM CHLORIDE 600; 310; 30; 20 MG/100ML; MG/100ML; MG/100ML; MG/100ML
INJECTION, SOLUTION INTRAVENOUS CONTINUOUS
Status: DISCONTINUED | OUTPATIENT
Start: 2025-04-22 | End: 2025-04-22 | Stop reason: HOSPADM

## 2025-04-22 RX ORDER — KETAMINE HYDROCHLORIDE 10 MG/ML
INJECTION INTRAMUSCULAR; INTRAVENOUS PRN
Status: DISCONTINUED | OUTPATIENT
Start: 2025-04-22 | End: 2025-04-22

## 2025-04-22 RX ORDER — ONDANSETRON 2 MG/ML
4 INJECTION INTRAMUSCULAR; INTRAVENOUS EVERY 30 MIN PRN
Status: DISCONTINUED | OUTPATIENT
Start: 2025-04-22 | End: 2025-04-22 | Stop reason: HOSPADM

## 2025-04-22 RX ORDER — SODIUM CHLORIDE, SODIUM LACTATE, POTASSIUM CHLORIDE, CALCIUM CHLORIDE 600; 310; 30; 20 MG/100ML; MG/100ML; MG/100ML; MG/100ML
INJECTION, SOLUTION INTRAVENOUS CONTINUOUS PRN
Status: DISCONTINUED | OUTPATIENT
Start: 2025-04-22 | End: 2025-04-22

## 2025-04-22 RX ORDER — FENTANYL CITRATE 50 UG/ML
25 INJECTION, SOLUTION INTRAMUSCULAR; INTRAVENOUS EVERY 5 MIN PRN
Status: DISCONTINUED | OUTPATIENT
Start: 2025-04-22 | End: 2025-04-22 | Stop reason: HOSPADM

## 2025-04-22 RX ORDER — LIDOCAINE HYDROCHLORIDE 20 MG/ML
INJECTION, SOLUTION INFILTRATION; PERINEURAL PRN
Status: DISCONTINUED | OUTPATIENT
Start: 2025-04-22 | End: 2025-04-22

## 2025-04-22 RX ORDER — CEFAZOLIN SODIUM/WATER 2 G/20 ML
2 SYRINGE (ML) INTRAVENOUS
Status: DISCONTINUED | OUTPATIENT
Start: 2025-04-22 | End: 2025-04-22 | Stop reason: HOSPADM

## 2025-04-22 RX ORDER — HYDROCODONE BITARTRATE AND ACETAMINOPHEN 5; 325 MG/1; MG/1
1 TABLET ORAL EVERY 6 HOURS PRN
Qty: 10 TABLET | Refills: 0 | Status: SHIPPED | OUTPATIENT
Start: 2025-04-22 | End: 2025-04-25

## 2025-04-22 RX ORDER — HYDROMORPHONE HCL IN WATER/PF 6 MG/30 ML
0.4 PATIENT CONTROLLED ANALGESIA SYRINGE INTRAVENOUS EVERY 5 MIN PRN
Status: DISCONTINUED | OUTPATIENT
Start: 2025-04-22 | End: 2025-04-22 | Stop reason: HOSPADM

## 2025-04-22 RX ORDER — FENTANYL CITRATE 50 UG/ML
50 INJECTION, SOLUTION INTRAMUSCULAR; INTRAVENOUS EVERY 5 MIN PRN
Status: DISCONTINUED | OUTPATIENT
Start: 2025-04-22 | End: 2025-04-22 | Stop reason: HOSPADM

## 2025-04-22 RX ORDER — HEPARIN SODIUM (PORCINE) LOCK FLUSH IV SOLN 100 UNIT/ML 100 UNIT/ML
500 SOLUTION INTRAVENOUS ONCE
Status: COMPLETED | OUTPATIENT
Start: 2025-04-22 | End: 2025-04-22

## 2025-04-22 RX ORDER — OXYCODONE HYDROCHLORIDE 5 MG/1
10 TABLET ORAL ONCE
Status: COMPLETED | OUTPATIENT
Start: 2025-04-22 | End: 2025-04-22

## 2025-04-22 RX ORDER — PROPOFOL 10 MG/ML
INJECTION, EMULSION INTRAVENOUS PRN
Status: DISCONTINUED | OUTPATIENT
Start: 2025-04-22 | End: 2025-04-22

## 2025-04-22 RX ORDER — GLYCOPYRROLATE 0.2 MG/ML
INJECTION, SOLUTION INTRAMUSCULAR; INTRAVENOUS PRN
Status: DISCONTINUED | OUTPATIENT
Start: 2025-04-22 | End: 2025-04-22

## 2025-04-22 RX ORDER — NALOXONE HYDROCHLORIDE 0.4 MG/ML
0.1 INJECTION, SOLUTION INTRAMUSCULAR; INTRAVENOUS; SUBCUTANEOUS
Status: DISCONTINUED | OUTPATIENT
Start: 2025-04-22 | End: 2025-04-22 | Stop reason: HOSPADM

## 2025-04-22 RX ADMIN — SODIUM CHLORIDE, SODIUM LACTATE, POTASSIUM CHLORIDE, AND CALCIUM CHLORIDE: .6; .31; .03; .02 INJECTION, SOLUTION INTRAVENOUS at 07:35

## 2025-04-22 RX ADMIN — HYDROMORPHONE HYDROCHLORIDE 0.2 MG: 0.2 INJECTION, SOLUTION INTRAMUSCULAR; INTRAVENOUS; SUBCUTANEOUS at 08:33

## 2025-04-22 RX ADMIN — DEXMEDETOMIDINE HYDROCHLORIDE 20 MCG: 100 INJECTION, SOLUTION INTRAVENOUS at 07:40

## 2025-04-22 RX ADMIN — LIDOCAINE HYDROCHLORIDE 80 MG: 20 INJECTION, SOLUTION INFILTRATION; PERINEURAL at 07:41

## 2025-04-22 RX ADMIN — PROPOFOL 30 MG: 10 INJECTION, EMULSION INTRAVENOUS at 07:42

## 2025-04-22 RX ADMIN — GLYCOPYRROLATE 0.2 MG: 0.2 INJECTION, SOLUTION INTRAMUSCULAR; INTRAVENOUS at 07:38

## 2025-04-22 RX ADMIN — MIDAZOLAM 1 MG: 1 INJECTION INTRAMUSCULAR; INTRAVENOUS at 07:38

## 2025-04-22 RX ADMIN — FENTANYL CITRATE 25 MCG: 50 INJECTION, SOLUTION INTRAMUSCULAR; INTRAVENOUS at 08:15

## 2025-04-22 RX ADMIN — OXYCODONE HYDROCHLORIDE 10 MG: 5 TABLET ORAL at 08:34

## 2025-04-22 RX ADMIN — Medication 500 UNITS: at 09:27

## 2025-04-22 RX ADMIN — PROPOFOL 25 MG: 10 INJECTION, EMULSION INTRAVENOUS at 07:45

## 2025-04-22 RX ADMIN — Medication 20 MG: at 07:42

## 2025-04-22 RX ADMIN — FENTANYL CITRATE 25 MCG: 50 INJECTION, SOLUTION INTRAMUSCULAR; INTRAVENOUS at 08:09

## 2025-04-22 RX ADMIN — GLYCOPYRROLATE 0.2 MG: 0.2 INJECTION, SOLUTION INTRAMUSCULAR; INTRAVENOUS at 07:41

## 2025-04-22 RX ADMIN — HYDROMORPHONE HYDROCHLORIDE 0.2 MG: 0.2 INJECTION, SOLUTION INTRAMUSCULAR; INTRAVENOUS; SUBCUTANEOUS at 08:21

## 2025-04-22 ASSESSMENT — ACTIVITIES OF DAILY LIVING (ADL)
ADLS_ACUITY_SCORE: 41

## 2025-04-22 ASSESSMENT — ENCOUNTER SYMPTOMS
DYSRHYTHMIAS: 0
SEIZURES: 0

## 2025-04-22 ASSESSMENT — LIFESTYLE VARIABLES: TOBACCO_USE: 0

## 2025-04-22 NOTE — OP NOTE
[facility]     Brief Operative Note     Pre-operative diagnosis:         Frozen shoulder [M75.00]  Post-operative diagnosis        Same as pre-operative diagnosis     Procedure:      LEFT SHOULDER MANIPULATION UNDER ANESTHESIA, Left - Update     Surgeon:         Surgeons and Role:     * Jose Jackson MD - Primary     * Kolby Garcia PA-C - Assisting  Anesthesia:     MAC      Estimated Blood Loss: None     Drains: None  Specimens:     * No specimens in log *  Findings:                     None.  Complications:            None.  Implants:         * No implants in log *          Description of operative note:    After a time out and confirmation of correct left side, under sterile prep 10 ml of each of lidocaine 1% and 0.5 of marcaine was injected into the shoulder joint. Gentle but firm pressure was applied to the upper arm.  Release of the left shoulder capsule was felt and the manipulation was done achieved with the arm getting more motion to where the biceps was near the ear.  No further pressure was applied.    No sponges or instruments or skin incision was made.

## 2025-04-22 NOTE — OR NURSING
Meets criteria for discharge.  Discharge instructions reviewed with pt and pt's designated responsible party.  Pt label on prescription bag from pharmacy matched to pt's wristband. Pharmacy bag opened with 1 prescriptions inside. Medications were reviewed to match pt wristband while pt and significant other agreed with identification. Prescriptions placed back in pharmacy bag resealed with tape and  per pt request.

## 2025-04-22 NOTE — DISCHARGE INSTRUCTIONS
Same Day Surgery Discharge Instructions for  Sedation and General Anesthesia     It's not unusual to feel dizzy, light-headed or faint for up to 24 hours after surgery or while taking pain medication.  If you have these symptoms: sit for a few minutes before standing and have someone assist you when you get up to walk or use the bathroom.    You should rest and relax for the next 24 hours. We recommend you make arrangements to have an adult stay with you for at least 24 hours after your discharge.  Avoid hazardous and strenuous activity.    DO NOT DRIVE any vehicle or operate mechanical equipment for 24 hours following the end of your surgery.  Even though you may feel normal, your reactions may be affected by the medication you have received.    Do not drink alcoholic beverages for 24 hours following surgery.     Slowly progress to your regular diet as you feel able. It's not unusual to feel nauseated and/or vomit after receiving anesthesia.  If you develop these symptoms, drink clear liquids (apple juice, ginger ale, broth, 7-up, etc. ) until you feel better.  If your nausea and vomiting persists for 24 hours, please notify your surgeon.      All narcotic pain medications, along with inactivity and anesthesia, can cause constipation. Drinking plenty of liquids and increasing fiber intake will help.    For any questions of a medical nature, call your surgeon.    Do not make important decisions for 24 hours.    If you had general anesthesia, you may have a sore throat for a couple of days related to the breathing tube used during surgery.  You may use Cepacol lozenges to help with this discomfort.  If it worsens or if you develop a fever, contact your surgeon.     If you feel your pain is not well managed with the pain medications prescribed by your surgeon, please contact your surgeon's office to let them know so they can address your concerns.     **SEE DR. WANG'S HANDOUT FOR ADDITIONAL DISCHARGE  INSTRUCTIONS**    **If you have questions or concerns about your procedure,   call  at **

## 2025-04-22 NOTE — ANESTHESIA POSTPROCEDURE EVALUATION
Patient: Monse Hutchins Cranfill    Procedure: Procedure(s):  LEFT SHOULDER MANIPULATION UNDER ANESTHESIA       Anesthesia Type:  MAC    Note:  Disposition: Inpatient   Postop Pain Control: Uneventful            Sign Out: Well controlled pain   PONV: No   Neuro/Psych: Uneventful            Sign Out: Acceptable/Baseline neuro status   Airway/Respiratory: Uneventful            Sign Out: Acceptable/Baseline resp. status   CV/Hemodynamics: Uneventful            Sign Out: Acceptable CV status; No obvious hypovolemia; No obvious fluid overload   Other NRE: NONE   DID A NON-ROUTINE EVENT OCCUR? No           Last vitals:  Vitals Value Taken Time   /67 04/22/25 0845   Temp 36.7  C (98.1  F) 04/22/25 0845   Pulse 112 04/22/25 0852   Resp 17 04/22/25 0900   SpO2 94 % 04/22/25 0852   Vitals shown include unfiled device data.    Electronically Signed By: Celi Almaraz MD  April 22, 2025  12:09 PM

## 2025-04-22 NOTE — ANESTHESIA CARE TRANSFER NOTE
Patient: Monse Hutchins [place]fill    Procedure: Procedure(s):  LEFT SHOULDER MANIPULATION UNDER ANESTHESIA       Diagnosis: Frozen shoulder [M75.00]  Diagnosis Additional Information: No value filed.    Anesthesia Type:   MAC     Note:    Oropharynx: oropharynx clear of all foreign objects and spontaneously breathing  Level of Consciousness: awake  Oxygen Supplementation: face mask    Independent Airway: airway patency satisfactory and stable  Dentition: dentition unchanged  Vital Signs Stable: post-procedure vital signs reviewed and stable  Report to RN Given: handoff report given  Patient transferred to: PACU    Handoff Report: Identifed the Patient, Identified the Reponsible Provider, Reviewed the pertinent medical history, Discussed the surgical course, Reviewed Intra-OP anesthesia mangement and issues during anesthesia, Set expectations for post-procedure period and Allowed opportunity for questions and acknowledgement of understanding      Vitals:  Vitals Value Taken Time   /113 04/22/25 0800   Temp     Pulse 114 04/22/25 0803   Resp 12 04/22/25 0803   SpO2 99 % 04/22/25 0803   Vitals shown include unfiled device data.    Electronically Signed By: SUSY Meyers CRNA  April 22, 2025  8:05 AM

## 2025-04-22 NOTE — BRIEF OP NOTE
[facility]    Brief Operative Note    Pre-operative diagnosis: Frozen shoulder [M75.00]  Post-operative diagnosis Same as pre-operative diagnosis    Procedure: LEFT SHOULDER MANIPULATION UNDER ANESTHESIA, Left - Update    Surgeon: Surgeons and Role:     * Jose Jackson MD - Primary     * Kolby Garcia PA-C - Assisting  Anesthesia: MAC   Estimated Blood Loss: None    Drains: None  Specimens: * No specimens in log *  Findings:   None.  Complications: None.  Implants: * No implants in log *

## 2025-05-18 ENCOUNTER — HEALTH MAINTENANCE LETTER (OUTPATIENT)
Age: 45
End: 2025-05-18

## (undated) RX ORDER — HEPARIN SODIUM (PORCINE) LOCK FLUSH IV SOLN 100 UNIT/ML 100 UNIT/ML
SOLUTION INTRAVENOUS
Status: DISPENSED
Start: 2025-04-22

## (undated) RX ORDER — OXYCODONE HYDROCHLORIDE 5 MG/1
TABLET ORAL
Status: DISPENSED
Start: 2025-04-22

## (undated) RX ORDER — PROPOFOL 10 MG/ML
INJECTION, EMULSION INTRAVENOUS
Status: DISPENSED
Start: 2025-04-22

## (undated) RX ORDER — HYDROMORPHONE HCL IN WATER/PF 6 MG/30 ML
PATIENT CONTROLLED ANALGESIA SYRINGE INTRAVENOUS
Status: DISPENSED
Start: 2025-04-22

## (undated) RX ORDER — GLYCOPYRROLATE 0.2 MG/ML
INJECTION, SOLUTION INTRAMUSCULAR; INTRAVENOUS
Status: DISPENSED
Start: 2025-04-22

## (undated) RX ORDER — BUPIVACAINE HYDROCHLORIDE 5 MG/ML
INJECTION, SOLUTION EPIDURAL; INTRACAUDAL; PERINEURAL
Status: DISPENSED
Start: 2025-04-22

## (undated) RX ORDER — LIDOCAINE HYDROCHLORIDE 10 MG/ML
INJECTION, SOLUTION INFILTRATION; PERINEURAL
Status: DISPENSED
Start: 2025-04-22

## (undated) RX ORDER — FENTANYL CITRATE 0.05 MG/ML
INJECTION, SOLUTION INTRAMUSCULAR; INTRAVENOUS
Status: DISPENSED
Start: 2025-04-22